# Patient Record
Sex: MALE | Race: WHITE | NOT HISPANIC OR LATINO | Employment: FULL TIME | URBAN - METROPOLITAN AREA
[De-identification: names, ages, dates, MRNs, and addresses within clinical notes are randomized per-mention and may not be internally consistent; named-entity substitution may affect disease eponyms.]

---

## 2017-09-26 ENCOUNTER — HOSPITAL ENCOUNTER (OUTPATIENT)
Dept: LAB | Facility: MEDICAL CENTER | Age: 52
End: 2017-09-26
Payer: COMMERCIAL

## 2017-09-26 LAB
BDY FAT % MEASURED: 22.6 %
BP DIAS: 77 MMHG
BP SYS: 130 MMHG
CHOLEST SERPL-MCNC: 171 MG/DL (ref 100–199)
DIABETES HTDIA: NO
EVENT NAME HTEVT: NORMAL
FASTING HTFAS: YES
GLUCOSE SERPL-MCNC: 97 MG/DL (ref 65–99)
HDLC SERPL-MCNC: 30 MG/DL
HYPERTENSION HTHYP: NO
LDLC SERPL CALC-MCNC: 86 MG/DL
SCREENING LOC CITY HTCIT: NORMAL
SCREENING LOC STATE HTSTA: NORMAL
SCREENING LOCATION HTLOC: NORMAL
SMOKING HTSMO: NO
SUBSCRIBER ID HTSID: NORMAL
TRIGL SERPL-MCNC: 276 MG/DL (ref 0–149)

## 2017-09-26 PROCEDURE — 80061 LIPID PANEL: CPT

## 2017-09-26 PROCEDURE — 36415 COLL VENOUS BLD VENIPUNCTURE: CPT

## 2017-09-26 PROCEDURE — 82947 ASSAY GLUCOSE BLOOD QUANT: CPT

## 2017-09-26 PROCEDURE — S5190 WELLNESS ASSESSMENT BY NONPH: HCPCS

## 2017-11-02 ENCOUNTER — IMMUNIZATION (OUTPATIENT)
Dept: OCCUPATIONAL MEDICINE | Facility: CLINIC | Age: 52
End: 2017-11-02

## 2017-11-02 DIAGNOSIS — Z23 NEED FOR VACCINATION: ICD-10-CM

## 2017-11-02 PROCEDURE — 90686 IIV4 VACC NO PRSV 0.5 ML IM: CPT | Performed by: PREVENTIVE MEDICINE

## 2017-12-06 ENCOUNTER — OFFICE VISIT (OUTPATIENT)
Dept: MEDICAL GROUP | Facility: MEDICAL CENTER | Age: 52
End: 2017-12-06
Payer: COMMERCIAL

## 2017-12-06 VITALS
RESPIRATION RATE: 16 BRPM | BODY MASS INDEX: 28.56 KG/M2 | TEMPERATURE: 97.2 F | HEIGHT: 71 IN | DIASTOLIC BLOOD PRESSURE: 72 MMHG | SYSTOLIC BLOOD PRESSURE: 142 MMHG | OXYGEN SATURATION: 96 % | HEART RATE: 68 BPM | WEIGHT: 204 LBS

## 2017-12-06 DIAGNOSIS — Z00.00 ROUTINE GENERAL MEDICAL EXAMINATION AT A HEALTH CARE FACILITY: ICD-10-CM

## 2017-12-06 DIAGNOSIS — R03.0 ELEVATED BLOOD PRESSURE READING: ICD-10-CM

## 2017-12-06 DIAGNOSIS — G47.00 INSOMNIA, UNSPECIFIED TYPE: ICD-10-CM

## 2017-12-06 DIAGNOSIS — Z12.11 ENCOUNTER FOR SCREENING COLONOSCOPY: ICD-10-CM

## 2017-12-06 DIAGNOSIS — E03.4 HYPOTHYROIDISM DUE TO ACQUIRED ATROPHY OF THYROID: ICD-10-CM

## 2017-12-06 PROCEDURE — 99203 OFFICE O/P NEW LOW 30 MIN: CPT | Performed by: NURSE PRACTITIONER

## 2017-12-06 ASSESSMENT — PATIENT HEALTH QUESTIONNAIRE - PHQ9: CLINICAL INTERPRETATION OF PHQ2 SCORE: 0

## 2017-12-06 ASSESSMENT — ENCOUNTER SYMPTOMS: INSOMNIA: 1

## 2017-12-06 NOTE — LETTER
Cater to u  [facility]  [address] [facility] [address]  [place] [address]  Fax: [phone]   Authorization for Release/Disclosure of   Protected Health Information   Name: WILDER MCKEON : 1965 SSN: [id number]   Address: [address] [place] [address], [facility]  [place] [address] Phone:    [phone] (home) [phone] (work)   I authorize the entity listed below to release/disclose the PHI below to:   [facility] and [facility]   Provider or Entity Name:     Address   City, State, Zip   Phone:      Fax:     Reason for request: continuity of care   Information to be released:    [  ] LAST COLONOSCOPY,  including any PATH REPORT and follow-up  [  ] LAST FIT/COLOGUARD RESULT [  ] LAST DEXA  [  ] LAST MAMMOGRAM  [  ] LAST PAP  [  ] LAST LABS [  ] RETINA EXAM REPORT  [  ] IMMUNIZATION RECORDS  [  ] Release all info      [  ] Check here and initial the line next to each item to release ALL health information INCLUDING  _____ Care and treatment for drug and / or alcohol abuse  _____ HIV testing, infection status, or AIDS  _____ Genetic Testing    DATES OF SERVICE OR TIME PERIOD TO BE DISCLOSED: _____________  I understand and acknowledge that:  * This Authorization may be revoked at any time by you in writing, except if your health information has already been used or disclosed.  * Your health information that will be used or disclosed as a result of you signing this authorization could be re-disclosed by the recipient. If this occurs, your re-disclosed health information may no longer be protected by State or Federal laws.  * You may refuse to sign this Authorization. Your refusal will not affect your ability to obtain treatment.  * This Authorization becomes effective upon signing and will  on (date) __________.      If no date is indicated, this Authorization will  one (1) year from the signature date.    Name: Wilder Mckeon    Signature:   Date:     2017          PLEASE FAX REQUESTED RECORDS BACK TO: [phone]

## 2017-12-06 NOTE — PROGRESS NOTES
"Subjective:      Adryan Rashid is a 52 y.o. male who presents with Establish Care            HPI Adryan Rashid Is here today to establish care and for need of lab work. He is an  with our organization.      1. Hypothyroidism due to acquired atrophy of thyroid  Patient reports at one time he was told he had hypothyroidism and was placed on medication through a different office. He states he stopped the medicine on his own and has not had recent lab work. He does not report problems with fatigue. He would like to get blood work done.    2. Elevated blood pressure reading  Blood pressure noted to be mildly elevated in the office today. He states in the past that also has been elevated but often it is normal as well.    3. Insomnia, unspecified type  Patient states he has no trouble falling asleep but sometimes wakes up throughout the night. He states he does not have sleep apnea symptoms and does not think he has depression. He does not take medication for this    4. Routine general medical examination at a health care facility  Patient due for routine blood work.    5. Encounter for screening colonoscopy  Patient due for her first colonoscopy.  Social History   Substance Use Topics   • Smoking status: Never Smoker   • Smokeless tobacco: Never Used   • Alcohol use Yes      Comment: rare     No current outpatient prescriptions on file.     No current facility-administered medications for this visit.      Family History   Problem Relation Age of Onset   • Arrythmia Mother    • Arrythmia Father    History reviewed. No pertinent past medical history.    Review of Systems   Psychiatric/Behavioral: The patient has insomnia.    All other systems reviewed and are negative.         Objective:     /72   Pulse 68   Temp 36.2 °C (97.2 °F)   Resp 16   Ht 1.803 m (5' 11\")   Wt 92.5 kg (204 lb)   SpO2 96%   BMI 28.45 kg/m²      Physical Exam   Constitutional: He is oriented to person, place, and time. He " appears well-developed and well-nourished. No distress.   HENT:   Head: Normocephalic and atraumatic.   Right Ear: External ear normal.   Left Ear: External ear normal.   Nose: Nose normal.   Mouth/Throat: Oropharynx is clear and moist.   Eyes: Conjunctivae are normal. Right eye exhibits no discharge. Left eye exhibits no discharge.   Neck: Normal range of motion. Neck supple. No tracheal deviation present. No thyromegaly present.   Cardiovascular: Normal rate, regular rhythm and normal heart sounds.    No murmur heard.  Pulmonary/Chest: Effort normal and breath sounds normal. No respiratory distress. He has no wheezes. He has no rales.   Abdominal: Soft. Bowel sounds are normal. He exhibits no distension and no mass. There is no tenderness. There is no rebound and no guarding. No hernia.   Lymphadenopathy:     He has no cervical adenopathy.   Neurological: He is alert and oriented to person, place, and time. Coordination normal.   Skin: Skin is warm and dry. No rash noted. He is not diaphoretic. No erythema.   Psychiatric: He has a normal mood and affect. His behavior is normal. Judgment and thought content normal.   Nursing note and vitals reviewed.              Assessment/Plan:     1. Hypothyroidism due to acquired atrophy of thyroid  Patient advised that if he has history of hypothyroidism, he should be on medication and have her regular blood work. I will have him follow back if he needs to get back on levothyroxin.  - TSH; Future    2. Elevated blood pressure reading  Patient advised to monitor his blood pressure outside the office with readings expected at 130/80 or less. If it is elevated he needs to return for medication.    3. Insomnia, unspecified type  We discussed sleep techniques and we agreed that medication does not need to be used but he does not have sleep apnea symptoms and does not want to see a counselor.    4. Routine general medical examination at a health care facility  Patient due for her  yearly routine blood work.  - COMP METABOLIC PANEL; Future  - PROSTATE SPECIFIC AG SCREENING; Future  - LIPID PROFILE; Future    5. Encounter for screening colonoscopy  Patient due for first colonoscopy.  - REFERRAL TO GI FOR COLONOSCOPY

## 2017-12-06 NOTE — LETTER
Corthera  [facility]  [address] [facility] [address]  [place] [address]  Fax: [phone]   Authorization for Release/Disclosure of   Protected Health Information   Name: WILDER MCKEON : 1965 SSN: [id number]   Address: [address] [place] [address], [facility]  [place] [address] Phone:    [phone] (home) [phone] (work)   I authorize the entity listed below to release/disclose the PHI below to:   [facility] and [facility]   Provider or Entity Name:     Address   City, State, Zip   Phone:      Fax:     Reason for request: continuity of care   Information to be released:    [  ] LAST COLONOSCOPY,  including any PATH REPORT and follow-up  [  ] LAST FIT/COLOGUARD RESULT [  ] LAST DEXA  [  ] LAST MAMMOGRAM  [  ] LAST PAP  [  ] LAST LABS [  ] RETINA EXAM REPORT  [  ] IMMUNIZATION RECORDS  [  ] Release all info      [  ] Check here and initial the line next to each item to release ALL health information INCLUDING  _____ Care and treatment for drug and / or alcohol abuse  _____ HIV testing, infection status, or AIDS  _____ Genetic Testing    DATES OF SERVICE OR TIME PERIOD TO BE DISCLOSED: _____________  I understand and acknowledge that:  * This Authorization may be revoked at any time by you in writing, except if your health information has already been used or disclosed.  * Your health information that will be used or disclosed as a result of you signing this authorization could be re-disclosed by the recipient. If this occurs, your re-disclosed health information may no longer be protected by State or Federal laws.  * You may refuse to sign this Authorization. Your refusal will not affect your ability to obtain treatment.  * This Authorization becomes effective upon signing and will  on (date) __________.      If no date is indicated, this Authorization will  one (1) year from the signature date.    Name: Wilder Mckeon    Signature:   Date:     2017          PLEASE FAX REQUESTED RECORDS BACK TO: [phone]

## 2018-01-02 ENCOUNTER — HOSPITAL ENCOUNTER (OUTPATIENT)
Dept: LAB | Facility: MEDICAL CENTER | Age: 53
End: 2018-01-02
Attending: NURSE PRACTITIONER
Payer: COMMERCIAL

## 2018-01-02 DIAGNOSIS — E03.4 HYPOTHYROIDISM DUE TO ACQUIRED ATROPHY OF THYROID: ICD-10-CM

## 2018-01-02 DIAGNOSIS — Z00.00 ROUTINE GENERAL MEDICAL EXAMINATION AT A HEALTH CARE FACILITY: ICD-10-CM

## 2018-01-02 LAB
ALBUMIN SERPL BCP-MCNC: 4.5 G/DL (ref 3.2–4.9)
ALBUMIN/GLOB SERPL: 1.2 G/DL
ALP SERPL-CCNC: 72 U/L (ref 30–99)
ALT SERPL-CCNC: 25 U/L (ref 2–50)
ANION GAP SERPL CALC-SCNC: 6 MMOL/L (ref 0–11.9)
AST SERPL-CCNC: 22 U/L (ref 12–45)
BILIRUB SERPL-MCNC: 0.7 MG/DL (ref 0.1–1.5)
BUN SERPL-MCNC: 16 MG/DL (ref 8–22)
CALCIUM SERPL-MCNC: 10 MG/DL (ref 8.5–10.5)
CHLORIDE SERPL-SCNC: 103 MMOL/L (ref 96–112)
CHOLEST SERPL-MCNC: 206 MG/DL (ref 100–199)
CO2 SERPL-SCNC: 28 MMOL/L (ref 20–33)
CREAT SERPL-MCNC: 1.03 MG/DL (ref 0.5–1.4)
GFR SERPL CREATININE-BSD FRML MDRD: >60 ML/MIN/1.73 M 2
GLOBULIN SER CALC-MCNC: 3.7 G/DL (ref 1.9–3.5)
GLUCOSE SERPL-MCNC: 131 MG/DL (ref 65–99)
HDLC SERPL-MCNC: 35 MG/DL
LDLC SERPL CALC-MCNC: 122 MG/DL
POTASSIUM SERPL-SCNC: 4.6 MMOL/L (ref 3.6–5.5)
PROT SERPL-MCNC: 8.2 G/DL (ref 6–8.2)
PSA SERPL-MCNC: 1.58 NG/ML (ref 0–4)
SODIUM SERPL-SCNC: 137 MMOL/L (ref 135–145)
TRIGL SERPL-MCNC: 244 MG/DL (ref 0–149)
TSH SERPL DL<=0.005 MIU/L-ACNC: 8.02 UIU/ML (ref 0.38–5.33)

## 2018-01-02 PROCEDURE — 80053 COMPREHEN METABOLIC PANEL: CPT

## 2018-01-02 PROCEDURE — 84153 ASSAY OF PSA TOTAL: CPT

## 2018-01-02 PROCEDURE — 84443 ASSAY THYROID STIM HORMONE: CPT

## 2018-01-02 PROCEDURE — 80061 LIPID PANEL: CPT

## 2018-01-02 PROCEDURE — 36415 COLL VENOUS BLD VENIPUNCTURE: CPT

## 2018-01-03 DIAGNOSIS — E03.4 HYPOTHYROIDISM DUE TO ACQUIRED ATROPHY OF THYROID: ICD-10-CM

## 2018-01-03 DIAGNOSIS — R73.01 IMPAIRED FASTING GLUCOSE: ICD-10-CM

## 2018-01-03 RX ORDER — LEVOTHYROXINE SODIUM 0.05 MG/1
50 TABLET ORAL
Qty: 30 TAB | Refills: 11 | Status: SHIPPED | OUTPATIENT
Start: 2018-01-03

## 2018-09-10 ENCOUNTER — IMMUNIZATION (OUTPATIENT)
Dept: OCCUPATIONAL MEDICINE | Facility: CLINIC | Age: 53
End: 2018-09-10

## 2018-09-10 DIAGNOSIS — Z23 NEED FOR VACCINATION: ICD-10-CM

## 2018-09-10 PROCEDURE — 90686 IIV4 VACC NO PRSV 0.5 ML IM: CPT | Performed by: PREVENTIVE MEDICINE

## 2018-09-24 ENCOUNTER — HOSPITAL ENCOUNTER (OUTPATIENT)
Dept: LAB | Facility: MEDICAL CENTER | Age: 53
End: 2018-09-24
Payer: COMMERCIAL

## 2018-09-24 LAB
BDY FAT % MEASURED: 19.6 %
BP DIAS: 81 MMHG
BP SYS: 135 MMHG
CHOLEST SERPL-MCNC: 168 MG/DL (ref 100–199)
DIABETES HTDIA: NO
EVENT NAME HTEVT: NORMAL
FASTING HTFAS: YES
GLUCOSE SERPL-MCNC: 106 MG/DL (ref 65–99)
HDLC SERPL-MCNC: 32 MG/DL
HYPERTENSION HTHYP: NO
LDLC SERPL CALC-MCNC: 87 MG/DL
SCREENING LOC CITY HTCIT: NORMAL
SCREENING LOC STATE HTSTA: NORMAL
SCREENING LOCATION HTLOC: NORMAL
SMOKING HTSMO: NO
SUBSCRIBER ID HTSID: NORMAL
TRIGL SERPL-MCNC: 243 MG/DL (ref 0–149)

## 2018-09-24 PROCEDURE — 82947 ASSAY GLUCOSE BLOOD QUANT: CPT

## 2018-09-24 PROCEDURE — S5190 WELLNESS ASSESSMENT BY NONPH: HCPCS

## 2018-09-24 PROCEDURE — 36415 COLL VENOUS BLD VENIPUNCTURE: CPT

## 2018-09-24 PROCEDURE — 80061 LIPID PANEL: CPT
